# Patient Record
Sex: FEMALE | Race: WHITE | Employment: FULL TIME | URBAN - METROPOLITAN AREA
[De-identification: names, ages, dates, MRNs, and addresses within clinical notes are randomized per-mention and may not be internally consistent; named-entity substitution may affect disease eponyms.]

---

## 2017-01-09 ENCOUNTER — TELEPHONE (OUTPATIENT)
Dept: OBGYN | Facility: CLINIC | Age: 27
End: 2017-01-09

## 2017-01-09 NOTE — Clinical Note
February 9, 2017      Courtney Rebolledo  [address] [facility] [address]        Dear Courtney,    We have made contact with your insurance company as below based on the information that we have. Your actual coverage may differ. [facility] can not guarantee coverage. We recommend that if you have questions regarding coverage to call your insurance company or your insurance provider. Again, this is not a guarantee of benefits only information sharing in what we were given through your insurance that you have on file.  Insurance Co [employer] Phone # [phone]  ID [id number]   Group [id number]  Spoke w/ Sabine on 2/9/2017    Policy Eff Date 4/18/2016 and current Yes  CoInsurance 90% / 10%  Deductible $350.00 met to date $ZERO  MOOP $2000.00 met to date $ZERO  CoPay NA  Prior Auth Required:  No    Pre Existing Condition No      Surgeon JOSE ALBERTO Malhotra In Network Yes  Hospital [facility] In Network Yes    Referral Needed:  No.    Mailed to Patient Yes                  If No Document why by date                  If Yes                                   Date 2/9/2017      Sincerely,    Nata Mishra  Surgery Scheduler

## 2017-01-09 NOTE — TELEPHONE ENCOUNTER
Patient surgery scheduled on 2/15/2017 at 7:30am Check in 6:00am  Location for surgery to performed:   Endoscopy  Scheduled by Vadim 1/9/2017     Information Packet given :Yes: MAILED 1/9/2017    CPT codes given: Yes    40762        Consents signed? No  Rep Informed :N/A    PREOP DATE :  Update at [facility] [place]  In Epic :Yes    On Spreadsheet :Yes    On Calendar EB  :No    In [place] Calendar TOM  :No    Assist NA   Assist in Epic NA  Assist Notified as needed :CHRISTIANO Mishra  Surgery Scheduler

## 2017-01-09 NOTE — TELEPHONE ENCOUNTER
Patient Name:  Courtney Rebolledo ([id number]).  :  1990      Physician requests assist from:  None  Requested Dates:  2017  Schedule based on:  MD schedule  Amount of time needed for the procedure:  30min   Expected time off from work:  none  Surgeon:  Anastasiya Malhotra MD  Surgery permit to read:  LEEP  Preop Needed:  No  Location for surgery to performed:   Endoscopy  Anesthesia:  Paracervical block    -- Avelox [Moxifloxacin]    -- Chlorhexidine    -- Doxycycline    -- Quinolones   Nickel allergy:  Not Applicable  EMB: Not applicable    DIAGNOSIS:  LOKI III; severe cervical dysplasia    Special instructions:  none  Vendor Rep:  none  Meds Needed: None

## 2017-02-09 ENCOUNTER — TELEPHONE (OUTPATIENT)
Dept: OBGYN | Facility: CLINIC | Age: 27
End: 2017-02-09

## 2017-02-09 NOTE — TELEPHONE ENCOUNTER
Reason for Call:  Other call back    Detailed comments: Patient would like to know if she could   Take her medication the morning over her procedure    Phone Number Patient can be reached at: Home number on file [phone] (home)    Best Time: Anytime    Can we leave a detailed message on this number? YES    Call taken on 2/9/2017 at 4:02 PM by Susan Luke

## 2017-02-10 NOTE — TELEPHONE ENCOUNTER
Pt is wanting to know if she can take Lorazepam the morning of her surgery on 2/15/17 for LEEP. Note routed to Dr. Malhotra to review and advise.

## 2017-02-10 NOTE — TELEPHONE ENCOUNTER
Called pt and informed of Dr. Malhotra note below. Pt stated understanding and had no further questions.

## 2017-02-14 ENCOUNTER — TELEPHONE (OUTPATIENT)
Dept: NURSING | Facility: CLINIC | Age: 27
End: 2017-02-14

## 2017-02-14 NOTE — TELEPHONE ENCOUNTER
Pt schedule for a LEEP and possible cone biopsy. Pt starting to get very nervous. Discussed what a LEEP and cone biopsy are. Discussed what would happen when the pt gets to [facility]. Answered questions regarding procedure and what would happen after the procedure is done. Pt stated she felt much better. No further questions.

## 2017-02-15 ENCOUNTER — HOSPITAL ENCOUNTER (OUTPATIENT)
Facility: CLINIC | Age: 27
Discharge: HOME | End: 2017-02-15
Attending: OBSTETRICS & GYNECOLOGY | Admitting: OBSTETRICS & GYNECOLOGY
Payer: COMMERCIAL

## 2017-02-15 ENCOUNTER — SURGERY (OUTPATIENT)
Age: 27
End: 2017-02-15

## 2017-02-15 VITALS
SYSTOLIC BLOOD PRESSURE: 138 MMHG | WEIGHT: 293 LBS | RESPIRATION RATE: 16 BRPM | OXYGEN SATURATION: 99 % | BODY MASS INDEX: 43.4 KG/M2 | DIASTOLIC BLOOD PRESSURE: 87 MMHG | HEIGHT: 69 IN

## 2017-02-15 LAB — HCG SERPL QL: NEGATIVE

## 2017-02-15 PROCEDURE — 88307 TISSUE EXAM BY PATHOLOGIST: CPT | Mod: 26 | Performed by: OBSTETRICS & GYNECOLOGY

## 2017-02-15 PROCEDURE — 57522 CONIZATION OF CERVIX: CPT | Performed by: OBSTETRICS & GYNECOLOGY

## 2017-02-15 PROCEDURE — 36415 COLL VENOUS BLD VENIPUNCTURE: CPT | Performed by: OBSTETRICS & GYNECOLOGY

## 2017-02-15 PROCEDURE — 84703 CHORIONIC GONADOTROPIN ASSAY: CPT | Performed by: OBSTETRICS & GYNECOLOGY

## 2017-02-15 PROCEDURE — 88307 TISSUE EXAM BY PATHOLOGIST: CPT | Performed by: OBSTETRICS & GYNECOLOGY

## 2017-02-15 RX ORDER — ACETAMINOPHEN 325 MG/1
975 TABLET ORAL ONCE
Status: DISCONTINUED | OUTPATIENT
Start: 2017-02-15 | End: 2017-02-15 | Stop reason: HOSPADM

## 2017-02-15 NOTE — OP NOTE
DATE OF PROCEDURE:  02/15/2017       PREOPERATIVE DIAGNOSES:  Cervical intraepithelial neoplasia grade 3 (LOKI 3).      POSTOPERATIVE DIAGNOSES:  Cervical intraepithelial neoplasia grade 3 (LOKI 3).       PROCEDURE:  LEEP.       COMPLICATIONS:  None.      ESTIMATED BLOOD LOSS:  None.      ANESTHESIA:  Local paracervical block.      INDICATIONS:  Courtney Naylor is a 26-year-old woman with history of mildly abnormal Pap smears since .  At outside office, Courtney had colposcopy in 2016, which showed LOKI 2-3 at a 12 o'clock biopsy.  Repeat Pap smear and colposcopy revealed similar results with us in late December.  She was therefore counseled and consented for a LEEP procedure for definitive as well as diagnostic surgical management.      DETAILS OF PROCEDURE:  Courtney was taken to the endoscopy suite, where she was positioned in the dorsal lithotomy position.  A coated speculum was placed into the vagina, and Lugol solution was applied to the cervix.  Lack of uptake was noted at 12 o'clock position as well as a small area at 6 o'clock position.  Paracervical block was then performed using a 1% lidocaine with epinephrine solution with 5 mL being injected at 12, 4 and 8 o'clock.  Medium size loop was then selected, and LEEP procedure was performed without difficulty.  Electrocautery ball was then used to cauterize the bed of the biopsy with minimal bleeding noted.  Monsel solution was also used.  Speculum was removed, and the patient was taken to the recovery room in stable condition.  All specimens will be sent to pathology for further evaluation.         HAY CASTELAN MD             D: 02/15/2017 08:29   T: 02/15/2017 12:01   MT: KIM#[id number]      Name:     COURTNEY NAYLOR   MRN:      [id number]        Account:        [id number]   :      1990           Procedure Date: 02/15/2017      Document: [id number]       cc: Hay Castelan MD

## 2017-02-15 NOTE — H&P
This H&P has been reviewed and there are no clinically significant changes in the patient s condition.  The Patient is approved for surgery.    27yo female here for LEEP due to severe cervical dysplasia.  Will proceed as planned    Anastasiya Malhotra MD

## 2017-02-15 NOTE — BRIEF OP NOTE
[facility] Brief Operative Note    Pre-operative diagnosis: SIN 3   Post-operative diagnosis LOKI III     Procedure: Procedure(s):  LOOP ELECTROSURGICAL EXCISION PROCEDURE(PARA CEVICAL BLOCK) - Wound Class: II-Clean Contaminated   Surgeon(s): Surgeon(s) and Role:     * Anastasiya Malhotra MD - Primary   Estimated blood loss: * No values recorded between 2/15/2017 12:00 AM and 2/15/2017  8:25 AM *    Specimens: * No specimens in log *   Findings: Lack of uptake at 12 oclock and small area at 6 oclock; o/w normal cervix

## 2017-02-16 LAB — COPATH REPORT: NORMAL

## 2017-03-10 ENCOUNTER — OFFICE VISIT (OUTPATIENT)
Dept: URGENT CARE | Facility: URGENT CARE | Age: 27
End: 2017-03-10
Payer: COMMERCIAL

## 2017-03-10 VITALS
SYSTOLIC BLOOD PRESSURE: 130 MMHG | TEMPERATURE: 98.9 F | WEIGHT: 293 LBS | OXYGEN SATURATION: 98 % | DIASTOLIC BLOOD PRESSURE: 76 MMHG | BODY MASS INDEX: 41.95 KG/M2 | HEIGHT: 70 IN | HEART RATE: 109 BPM

## 2017-03-10 DIAGNOSIS — J10.1 INFLUENZA A: ICD-10-CM

## 2017-03-10 DIAGNOSIS — R52 BODY ACHES: ICD-10-CM

## 2017-03-10 DIAGNOSIS — R07.0 THROAT PAIN: Primary | ICD-10-CM

## 2017-03-10 DIAGNOSIS — J45.40 MODERATE PERSISTENT ASTHMA WITHOUT COMPLICATION: ICD-10-CM

## 2017-03-10 LAB
DEPRECATED S PYO AG THROAT QL EIA: NORMAL
FLUAV+FLUBV AG SPEC QL: ABNORMAL
FLUAV+FLUBV AG SPEC QL: POSITIVE
MICRO REPORT STATUS: NORMAL
SPECIMEN SOURCE: ABNORMAL
SPECIMEN SOURCE: NORMAL

## 2017-03-10 PROCEDURE — 87081 CULTURE SCREEN ONLY: CPT | Performed by: FAMILY MEDICINE

## 2017-03-10 PROCEDURE — 87880 STREP A ASSAY W/OPTIC: CPT | Performed by: FAMILY MEDICINE

## 2017-03-10 PROCEDURE — 99214 OFFICE O/P EST MOD 30 MIN: CPT | Performed by: FAMILY MEDICINE

## 2017-03-10 PROCEDURE — 87804 INFLUENZA ASSAY W/OPTIC: CPT | Performed by: FAMILY MEDICINE

## 2017-03-10 RX ORDER — PREDNISONE 20 MG/1
20 TABLET ORAL 2 TIMES DAILY
Qty: 10 TABLET | Refills: 0 | Status: SHIPPED | OUTPATIENT
Start: 2017-03-10 | End: 2017-07-25

## 2017-03-10 RX ORDER — OSELTAMIVIR PHOSPHATE 75 MG/1
75 CAPSULE ORAL 2 TIMES DAILY
Qty: 10 CAPSULE | Refills: 0 | Status: SHIPPED | OUTPATIENT
Start: 2017-03-10 | End: 2017-07-25

## 2017-03-10 NOTE — NURSING NOTE
"Chief Complaint   Patient presents with     Sinus Problem     face pressure 3x days      Pharyngitis     sore throat 3x days      Asthma     3x days        Initial /76  Pulse 109  Temp 98.9  F (37.2  C) (Oral)  Ht 5' 10\" (1.778 m)  Wt (!) 308 lb 14.4 oz (140.1 kg)  SpO2 98%  BMI 44.32 kg/m2 Estimated body mass index is 44.32 kg/(m^2) as calculated from the following:    Height as of this encounter: 5' 10\" (1.778 m).    Weight as of this encounter: 308 lb 14.4 oz (140.1 kg).  Medication Reconciliation: complete    "

## 2017-03-10 NOTE — MR AVS SNAPSHOT
"              After Visit Summary   3/10/2017    Courtney Rebolledo    MRN: [id number]           Patient Information     Date Of Birth          1990        Visit Information        Provider Department      3/10/2017 5:00 PM Sherri Gonzales MD [facility]        Today's Diagnoses     Throat pain    -  1    Body aches        Moderate persistent asthma without complication        Influenza A           Follow-ups after your visit        Who to contact     If you have questions or need follow up information about today's clinic visit or your schedule please contact [facility] directly at [phone].  Normal or non-critical lab and imaging results will be communicated to you by [employer], letter or phone within 4 business days after the clinic has received the results. If you do not hear from us within 7 days, please contact the clinic through [employer] or phone. If you have a critical or abnormal lab result, we will notify you by phone as soon as possible.  Submit refill requests through [employer] or call your pharmacy and they will forward the refill request to us. Please allow 3 business days for your refill to be completed.          Additional Information About Your Visit        MyChart Information     [employer] gives you secure access to your electronic health record. If you see a primary care provider, you can also send messages to your care team and make appointments. If you have questions, please call your primary care clinic.  If you do not have a primary care provider, please call [phone] and they will assist you.        Care EveryWhere ID     This is your Care EveryWhere ID. This could be used by other organizations to access your [facility] medical records  [id number]        Your Vitals Were     Pulse Temperature Height Pulse Oximetry BMI (Body Mass Index)       109 98.9  F (37.2  C) (Oral) 5' 10\" (1.778 m) 98% 44.32 kg/m2        Blood Pressure from Last 3 " Encounters:   03/10/17 130/76   02/15/17 138/87   12/27/16 132/76    Weight from Last 3 Encounters:   03/10/17 (!) 308 lb 14.4 oz (140.1 kg)   02/15/17 300 lb (136.1 kg)   12/27/16 (!) 315 lb (142.9 kg)              We Performed the Following     Beta strep group A culture     Influenza A/B antigen     Rapid strep screen          Today's Medication Changes          These changes are accurate as of: 3/10/17  6:34 PM.  If you have any questions, ask your nurse or doctor.               Start taking these medicines.        Dose/Directions    oseltamivir 75 MG capsule   Commonly known as:  TAMIFLU   Used for:  Influenza A   Started by:  Sherri Gonzales MD        Dose:  75 mg   Take 1 capsule (75 mg) by mouth 2 times daily   Quantity:  10 capsule   Refills:  0       predniSONE 20 MG tablet   Commonly known as:  DELTASONE   Used for:  Moderate persistent asthma without complication   Started by:  Sherri Gonzales MD        Dose:  20 mg   Take 1 tablet (20 mg) by mouth 2 times daily   Quantity:  10 tablet   Refills:  0            Where to get your medicines      Some of these will need a paper prescription and others can be bought over the counter.  Ask your nurse if you have questions.     Bring a paper prescription for each of these medications     oseltamivir 75 MG capsule    predniSONE 20 MG tablet                Primary Care Provider Office Phone # Fax #    Fer Cartwright [phone]       [address] [address]        Thank you!     Thank you for choosing [facility]  for your care. Our goal is always to provide you with excellent care. Hearing back from our patients is one way we can continue to improve our services. Please take a few minutes to complete the written survey that you may receive in the mail after your visit with us. Thank you!             Your Updated Medication List - Protect others around you: Learn how to safely use,  store and throw away your medicines at www.disposemymeds.org.          This list is accurate as of: 3/10/17  6:34 PM.  Always use your most recent med list.                   Brand Name Dispense Instructions for use    ADDERALL XR 20 MG per 24 hr capsule   Generic drug:  amphetamine-dextroamphetamine      TK 1 C PO ONCE D IN THE MORNING       DULERA 200-5 MCG/ACT oral inhaler   Generic drug:  mometasone-formoterol      INHALE 2 PUFFS PO BID       LORazepam 0.5 MG tablet    ATIVAN     TK UP TO 2 TS PER WEEK UTD       montelukast 10 MG tablet    SINGULAIR     TK 1 T PO HS       norgestrel-ethinyl estradiol 0.3-30 MG-MCG per tablet    LO/OVRAL    84 tablet    Take 1 tablet by mouth daily       oseltamivir 75 MG capsule    TAMIFLU    10 capsule    Take 1 capsule (75 mg) by mouth 2 times daily       predniSONE 20 MG tablet    DELTASONE    10 tablet    Take 1 tablet (20 mg) by mouth 2 times daily       VENTOLIN  (90 BASE) MCG/ACT Inhaler   Generic drug:  albuterol      INHALE 2 PUFFS PO QID PRN

## 2017-03-11 NOTE — PROGRESS NOTES
Chief Complaint   Patient presents with     Sinus Problem     face pressure 3x days      Pharyngitis     sore throat 3x days      Asthma     3x days      SUBJECTIVE:  Courtney Rebolledo, a 26 year old female with h/o throat pain , body ache and also asthma symptoms with coughing  scheduled an appointment to discuss the following issues:     Throat pain  Body aches  Moderate persistent asthma without complication  Influenza A     She has been noticing the symptoms for last 2 days   Has been using her rescue inhalor very often, she denies any acute fever but has been noticing some chills too   She also has nasal congestion , denies any acute sob or chest heaviness   Past Medical History   Diagnosis Date     Abnormal Pap smear of cervix 2014,15,16     ASC-US, +HRHPV     Anxiety      lorazepam prn     Asthma, moderate persistent      LOKI III with severe dysplasia 5/2016     Family history of breast cancer      mother age 36, MGM age 42     Morbid obesity (H)      BMI 43     Personal history of cervical dysplasia 5/2016     LOKI II-III on 12'oclock bx      Past Surgical History   Procedure Laterality Date     C oral surgery procedure       Knee surgery  2006     Conization leep N/A 2/15/2017     Procedure: CONIZATION LEEP;  Surgeon: Anastasiya Malhotra MD;  Location: [facility]        Social History     Social History     Marital status: Single     Spouse name: N/A     Number of children: N/A     Years of education: N/A     Occupational History     Not on file.     Social History Main Topics     Smoking status: Never Smoker     Smokeless tobacco: Never Used     Alcohol use 0.0 oz/week     0 Standard drinks or equivalent per week      Comment: twice monthly     Drug use: No     Sexual activity: Yes     Partners: Male     Birth control/ protection: Condom     Other Topics Concern     Not on file     Social History Narrative        Current Outpatient Prescriptions   Medication Sig Dispense Refill     oseltamivir (TAMIFLU) 75 MG capsule  "Take 1 capsule (75 mg) by mouth 2 times daily 10 capsule 0     predniSONE (DELTASONE) 20 MG tablet Take 1 tablet (20 mg) by mouth 2 times daily 10 tablet 0     norgestrel-ethinyl estradiol (LO/OVRAL) 0.3-30 MG-MCG per tablet Take 1 tablet by mouth daily 84 tablet 3     ADDERALL XR 20 MG per capsule TK 1 C PO ONCE D IN THE MORNING  0     DULERA 200-5 MCG/ACT oral inhaler INHALE 2 PUFFS PO BID  0     montelukast (SINGULAIR) 10 MG tablet TK 1 T PO HS  1     VENTOLIN  (90 BASE) MCG/ACT inhaler INHALE 2 PUFFS PO QID PRN  3     LORazepam (ATIVAN) 0.5 MG tablet TK UP TO 2 TS PER WEEK UTD  0       Health Maintenance   Topic Date Due     ASTHMA ACTION PLAN Q1 YR (NO INBASKET)  06/24/1996     ASTHMA CONTROL TEST Q6 MOS (NO INBASKET)  06/24/1996     HPV IMMUNIZATION (1 of 3 - Female 3 Dose Series) 06/24/2002     TETANUS IMMUNIZATION (SYSTEM ASSIGNED)  06/24/2009     INFLUENZA VACCINE (SYSTEM ASSIGNED)  09/01/2017     PAP SCREENING Q3 YR (SYSTEM ASSIGNED)  12/27/2019        ROS:  CONSTITUTIONAL:no fever, no chills or sweats, no excessive fatigue, no significant change in weight  CV: neg   RESP -coughing and wheezing   GI:  Neg   NEURO: neg   MSK - neg   Skin - neg   Pyschiatry-neg     OBJECTIVE:  /76  Pulse 109  Temp 98.9  F (37.2  C) (Oral)  Ht 5' 10\" (1.778 m)  Wt (!) 308 lb 14.4 oz (140.1 kg)  SpO2 98%  BMI 44.32 kg/m2      EXAM:  GENERAL APPEARANCE: healthy, alert and no distress  EYES: EOMI,  PERRL  HENT: ear canals and TM's normal and nose and mouth without ulcers or lesions  RESP: lungs clear to auscultation - no rales, rhonchi or wheezes  CV: regular rates and rhythm, normal S1 S2, no S3 or S4 and no murmur, click or rub -  ABDOMEN:  soft, nontender, no HSM or masses and bowel sounds normal  NEURO: Normal strength and tone, sensory exam grossly normal, mentation intact and speech normal    Results for orders placed or performed in visit on 03/10/17   Rapid strep screen   Result Value Ref Range    Specimen " Description Throat     Rapid Strep A Screen       NEGATIVE: No Group A streptococcal antigen detected by immunoassay, await   culture report.      Micro Report Status FINAL 03/10/2017    Influenza A/B antigen   Result Value Ref Range    Influenza A/B Agn Specimen Nasopharyngeal     Influenza A Positive (A) NEG    Influenza B  NEG     Negative   Test results must be correlated with clinical data. If necessary, results   should be confirmed by a molecular assay or viral culture.             ASSESSMENT/PLAN:  Courtney was seen today for sinus problem, pharyngitis and asthma.    Diagnoses and all orders for this visit:    Throat pain  -     Rapid strep screen  -     Beta strep group A culture    Body aches  -     Influenza A/B antigen    Moderate persistent asthma without complication  -     predniSONE (DELTASONE) 20 MG tablet; Take 1 tablet (20 mg) by mouth 2 times daily    Influenza A  -     oseltamivir (TAMIFLU) 75 MG capsule; Take 1 capsule (75 mg) by mouth 2 times daily    advised to continue on albuterol neb and also steroid inhalor for next 2 days till symptoms get better   Also consider starting prednisone oral if symptoms get worse         Follow up if  symptoms fail to improve or worsens   Pt understood and agreed with plan             Shreri Gonzales MD

## 2017-03-12 LAB
BACTERIA SPEC CULT: NORMAL
MICRO REPORT STATUS: NORMAL
SPECIMEN SOURCE: NORMAL

## 2017-07-25 ENCOUNTER — OFFICE VISIT (OUTPATIENT)
Dept: OBGYN | Facility: CLINIC | Age: 27
End: 2017-07-25
Payer: COMMERCIAL

## 2017-07-25 VITALS
SYSTOLIC BLOOD PRESSURE: 122 MMHG | HEART RATE: 76 BPM | WEIGHT: 293 LBS | BODY MASS INDEX: 41.95 KG/M2 | HEIGHT: 70 IN | DIASTOLIC BLOOD PRESSURE: 72 MMHG

## 2017-07-25 DIAGNOSIS — Z30.41 USES ORAL CONTRACEPTION: ICD-10-CM

## 2017-07-25 DIAGNOSIS — Z30.09 GENERAL COUNSELING AND ADVICE ON CONTRACEPTIVE MANAGEMENT: Primary | ICD-10-CM

## 2017-07-25 DIAGNOSIS — D06.9 CIN III WITH SEVERE DYSPLASIA: ICD-10-CM

## 2017-07-25 PROCEDURE — 99213 OFFICE O/P EST LOW 20 MIN: CPT | Performed by: OBSTETRICS & GYNECOLOGY

## 2017-07-25 NOTE — PROGRESS NOTES
SUBJECTIVE:                                                   Courtney Rebolledo is a 27 year old female who presents to clinic today for the following health issue(s):  Patient presents with:  Consult: here to talk about family planning, and wrap up loose ends prior to move to [place]. Not sure on timeline for pregnancy, but would like to change OCPs in the meantime.      HPI:  Here today to discuss current birth control.  HAs been using Lo-Ovral since her procedure with me in February due to irregular/more frequent menstrual cycles.  Over the last 6 months, has noted complete lack of menses as well as increased moodiness and breast tenderness.  No cramping.  Has been taking pills fairly consistently --occ missed pills but no spotting/bleeding.    Moodiness has been pronounced in the last few months.  Appreciates that this is likely multi-factorial but can't help but notice the temporal relationship between the changes and her pill.  Does not seem to be cyclic --no drastic changes during placebo week.    +breast tenderness --both breasts equally and again, does not seem to be cyclic.  Most days is anxious to get out of her bra due to discomfort.  Denies change in bras, bra size, weight, etc.  Not better or worse during placebo week.  Considering TTC in the next year.  Questions about LEEP and implications with pregnancy    No LMP recorded. Patient is not currently having periods (Reason: Birth Control)..   Patient is sexually active, No obstetric history on file..  Using oral contraceptives for contraception.    reports that she has never smoked. She has never used smokeless tobacco.      STD testing offered?  Declined    Health maintenance updated:  yes    Today's PHQ-2 Score:   PHQ-2 ( 1999 [employer]) 7/25/2017   Q1: Little interest or pleasure in doing things 0   Q2: Feeling down, depressed or hopeless 0   PHQ-2 Score 0     Today's PHQ-9 Score:   PHQ-9 SCORE 7/29/2016   Total Score 0     Today's AKILA-7 Score:   AKILA-7  "SCORE 7/29/2016   Total Score 7       Problem list and histories reviewed & adjusted, as indicated.  Additional history: as documented.    Patient Active Problem List   Diagnosis     Anxiety     Asthma, moderate persistent     Morbid obesity (H)     Abnormal Pap smear of cervix     Family history of breast cancer     Personal history of cervical dysplasia     LOKI III with severe dysplasia     Past Surgical History:   Procedure Laterality Date     C ORAL SURGERY PROCEDURE       CONIZATION LEEP N/A 2/15/2017    Procedure: CONIZATION LEEP;  Surgeon: Anastasiya Malhotra MD;  Location:  GI     KNEE SURGERY  2006      Social History   Substance Use Topics     Smoking status: Never Smoker     Smokeless tobacco: Never Used     Alcohol use 0.0 oz/week     0 Standard drinks or equivalent per week      Comment: twice monthly      Problem (# of Occurrences) Relation (Name,Age of Onset)    Breast Cancer (2) Mother (36): no BRCA testing, Maternal Grandmother (42)            Current Outpatient Prescriptions   Medication Sig     norgestrel-ethinyl estradiol (LO/OVRAL) 0.3-30 MG-MCG per tablet Take 1 tablet by mouth daily     ADDERALL XR 20 MG per capsule TK 1 C PO ONCE D IN THE MORNING     DULERA 200-5 MCG/ACT oral inhaler INHALE 2 PUFFS PO BID     montelukast (SINGULAIR) 10 MG tablet TK 1 T PO HS     VENTOLIN  (90 BASE) MCG/ACT inhaler INHALE 2 PUFFS PO QID PRN     LORazepam (ATIVAN) 0.5 MG tablet TK UP TO 2 TS PER WEEK UTD     No current facility-administered medications for this visit.      Allergies   Allergen Reactions     Avelox [Moxifloxacin]      Chlorhexidine      Doxycycline      Quinolones        ROS:  12 point review of systems negative other than symptoms noted below.  Breast: Tenderness  Genitourinary: Irregular Menses and No Periods  Skin: Rash  Endocrine: Excess Hair Growth  Psychiatric: Anxiety    OBJECTIVE:     /72  Pulse 76  Ht 5' 10\" (1.778 m)  Wt (!) 313 lb (142 kg)  BMI 44.91 kg/m2  Body mass " index is 44.91 kg/(m^2).    Exam:  Constitutional:  Appearance: Well nourished, well developed alert, in no acute distress  Neurologic/Psychiatric:  Mental Status:  Oriented X3      In-Clinic Test Results:  No results found for this or any previous visit (from the past 24 hour(s)).    ASSESSMENT/PLAN:                                                        ICD-10-CM    1. General counseling and advice on contraceptive management Z30.09    2. LOKI III with severe dysplasia D06.9    3. Uses oral contraception Z30.41        Patient Instructions   Long discussion regarding birth control options in the setting of Courtney's current concerns.  Discussed alternative birth control pill vs nuva ring vs progesterone only options taking into account her history of PCOS, recent LEEP for LOKI III and desires to try to conceive in the next several months.  Will continue current OCP x additional 3 months and then discontinue for the purposes of cycle monitoring in anticipation of TTC.  Will establish care with OB/GYN in [place] upon transfer.  Reminded her of the importance of repeating pap smear/HPV testing in 2/2018 due to severe cervical dysplasia this year.      Anastasiya Malhotra MD  [facility] [place]

## 2017-07-25 NOTE — MR AVS SNAPSHOT
After Visit Summary   7/25/2017    Courtney Rebolledo    MRN: [id number]           Patient Information     Date Of Birth          1990        Visit Information        Provider Department      7/25/2017 4:10 PM Anastasiya Malhotra MD [facility]        Today's Diagnoses     General counseling and advice on contraceptive management    -  1    LOKI III with severe dysplasia        Uses oral contraception          Care Instructions    Long discussion regarding birth control options in the setting of Courtney's current concerns.  Discussed alternative birth control pill vs nuva ring vs progesterone only options taking into account her history of PCOS, recent LEEP for LOKI III and desires to try to conceive in the next several months.  Will continue current OCP x additional 3 months and then discontinue for the purposes of cycle monitoring in anticipation of TTC.  Will establish care with OB/GYN in [place] upon transfer.  Reminded her of the importance of repeating pap smear/HPV testing in 2/2018 due to severe cervical dysplasia this year.          Follow-ups after your visit        Follow-up notes from your care team     Return if symptoms worsen or fail to improve.      Who to contact     If you have questions or need follow up information about today's clinic visit or your schedule please contact [facility] directly at [phone].  Normal or non-critical lab and imaging results will be communicated to you by MyChart, letter or phone within 4 business days after the clinic has received the results. If you do not hear from us within 7 days, please contact the clinic through MyChart or phone. If you have a critical or abnormal lab result, we will notify you by phone as soon as possible.  Submit refill requests through [employer] or call your pharmacy and they will forward the refill request to us. Please allow 3 business days for your refill to be completed.          Additional  "Information About Your Visit        MyChart Information     [employer] gives you secure access to your electronic health record. If you see a primary care provider, you can also send messages to your care team and make appointments. If you have questions, please call your primary care clinic.  If you do not have a primary care provider, please call [phone] and they will assist you.        Care EveryWhere ID     This is your Care EveryWhere ID. This could be used by other organizations to access your [place] medical records  [id number]        Your Vitals Were     Pulse Height BMI (Body Mass Index)             76 5' 10\" (1.778 m) 44.91 kg/m2          Blood Pressure from Last 3 Encounters:   07/25/17 122/72   03/10/17 130/76   02/15/17 138/87    Weight from Last 3 Encounters:   07/25/17 (!) 313 lb (142 kg)   03/10/17 (!) 308 lb 14.4 oz (140.1 kg)   02/15/17 300 lb (136.1 kg)              Today, you had the following     No orders found for display       Primary Care Provider Office Phone # Fax #    Fer Cartwright [phone]       [facility] [address] [place] [address]        Equal Access to Services     LIDYA VARELA : Hadii aad ku hadasho Soomaali, waaxda luqadaha, qaybta kaalmada adeegyada, waxay idiin hayparveenn davidson villa la'juan ah. So [facility] [phone].    ATENCIÓN: Si habla español, tiene a martinez disposición servicios gratuitos de asistencia lingüística. Llidania al [phone].    We comply with applicable federal civil rights laws and [place] laws. We do not discriminate on the basis of race, color, national origin, age, disability sex, sexual orientation or gender identity.            Thank you!     Thank you for choosing [facility]  for your care. Our goal is always to provide you with excellent care. Hearing back from our patients is one way we can continue to improve our services. Please take a few minutes to complete the written survey that you may " receive in the mail after your visit with us. Thank you!             Your Updated Medication List - Protect others around you: Learn how to safely use, store and throw away your medicines at www.disposemymeds.org.          This list is accurate as of: 7/25/17  8:01 PM.  Always use your most recent med list.                   Brand Name Dispense Instructions for use Diagnosis    ADDERALL XR 20 MG per 24 hr capsule   Generic drug:  amphetamine-dextroamphetamine      TK 1 C PO ONCE D IN THE MORNING        DULERA 200-5 MCG/ACT oral inhaler   Generic drug:  mometasone-formoterol      INHALE 2 PUFFS PO BID        LORazepam 0.5 MG tablet    ATIVAN     TK UP TO 2 TS PER WEEK UTD        montelukast 10 MG tablet    SINGULAIR     TK 1 T PO HS        norgestrel-ethinyl estradiol 0.3-30 MG-MCG per tablet    LO/OVRAL    84 tablet    Take 1 tablet by mouth daily    PCOS (polycystic ovarian syndrome)       VENTOLIN  (90 BASE) MCG/ACT Inhaler   Generic drug:  albuterol      INHALE 2 PUFFS PO QID PRN

## 2017-07-26 NOTE — PATIENT INSTRUCTIONS
Long discussion regarding birth control options in the setting of Courtney's current concerns.  Discussed alternative birth control pill vs nuva ring vs progesterone only options taking into account her history of PCOS, recent LEEP for LOKI III and desires to try to conceive in the next several months.  Will continue current OCP x additional 3 months and then discontinue for the purposes of cycle monitoring in anticipation of TTC.  Will establish care with OB/GYN in [place] upon transfer.  Reminded her of the importance of repeating pap smear/HPV testing in 2/2018 due to severe cervical dysplasia this year.

## 2020-03-10 ENCOUNTER — HEALTH MAINTENANCE LETTER (OUTPATIENT)
Age: 30
End: 2020-03-10

## 2021-02-10 NOTE — PROGRESS NOTES
SUBJECTIVE:                                                   Courtney Rebolledo is a 30 year old female who presents to clinic today for the following health issue(s):  Patient presents with:  Abnormal Uterine Bleeding: stopped OCP in November and had a normal cycle 11/20, then since second week of december through today has been having daily spotting between cycles      HPI:  Here today to discuss recent irregular menses/breakthrough bleeding/spotting in the last several months.  Has not been seen in our office since her LEEP in 2017.  Moved to [place] shortly thereafter and just back temporarily to help her mother recover from COVID.    Has been on and off Lo-Ovral OCPs for the last several years.  Longstanding history of irregular menses due to PCOS.  Currently TTC.   Had been on Lo-ovral at time of our last visit in 2017.  Stopped for approx 1.5yrs in [place] and had longer but fairly regular cycles.  ~35d cycles with ~7d menses.  Began having intermenstrual bleeding/spotting in mid 7643-8652 and started back on OCPs in hopes of regulating cycles.  Improved cycles and stopped again after 3 months of usage.  Has been back and forth on this cycle until recently.  Again stopped her Lo-Ovral in November and has been having nearly daily spotting with regular menses every 35d.  Light bleeding, no cramping.  Occ small clots noted.  +SA --does have bleeding after sex. Usually lasts a few days.  No pain or cramping.  Otherwise doing pretty well.  Normal energy, normal appetite, normal bowel/bladder function.  Began working with a nutritionist/dietician last May with regards to PCOS and hopes for weight loss in anticipation of getting pregnant.  Has some labs she would like checked today.  Hx LOKI II with me on LEEP in 2017 --had one normal pap smear in [place] and is now overdue for repeat.  Agrees to do today  Likely moving back to [place] in the next month or so        Patient's last menstrual period was 02/11/2021  (exact date)..     Patient is sexually active, No obstetric history on file..  Using none and natural family planning for contraception.    reports that she has never smoked. She has never used smokeless tobacco.    STD testing offered?  Declined    Health maintenance updated:  yes    Today's PHQ-2 Score:   PHQ-2 ( 1999 Pfizer) 7/25/2017   Q1: Little interest or pleasure in doing things 0   Q2: Feeling down, depressed or hopeless 0   PHQ-2 Score 0     Today's PHQ-9 Score:   PHQ-9 SCORE 2/12/2021   PHQ-9 Total Score 0     Today's AKILA-7 Score:   AKILA-7 SCORE 2/12/2021   Total Score 0       Problem list and histories reviewed & adjusted, as indicated.  Additional history: as documented.    Patient Active Problem List   Diagnosis     Anxiety     Asthma, moderate persistent     Morbid obesity (H)     Abnormal Pap smear of cervix     Family history of breast cancer     Personal history of cervical dysplasia     LOKI III with severe dysplasia     Past Surgical History:   Procedure Laterality Date     C ORAL SURGERY PROCEDURE       CONIZATION LEEP N/A 2/15/2017    Procedure: CONIZATION LEEP;  Surgeon: Anastasiya Malhotra MD;  Location:  GI     KNEE SURGERY  2006      Social History     Tobacco Use     Smoking status: Never Smoker     Smokeless tobacco: Never Used   Substance Use Topics     Alcohol use: Yes     Alcohol/week: 0.0 standard drinks     Comment: twice monthly      Problem (# of Occurrences) Relation (Name,Age of Onset)    Breast Cancer (2) Mother (36): no BRCA testing, Maternal Grandmother (42)    No Known Problems (6) Father, Sister, Brother, Maternal Grandfather, Paternal Grandmother, Other            Current Outpatient Medications   Medication Sig     budesonide-formoterol (SYMBICORT) 160-4.5 MCG/ACT Inhaler Inhale 2 puffs into the lungs 2 times daily     DULERA 200-5 MCG/ACT oral inhaler INHALE 2 PUFFS PO BID     montelukast (SINGULAIR) 10 MG tablet TK 1 T PO HS     VENTOLIN  (90 BASE) MCG/ACT inhaler  "INHALE 2 PUFFS PO QID PRN     metFORMIN (GLUCOPHAGE) 500 MG tablet Take 3 tablets by mouth every 24 hours     No current facility-administered medications for this visit.      Allergies   Allergen Reactions     Avelox [Moxifloxacin]      Chlorhexidine      Doxycycline      Quinolones        ROS:  12 point review of systems negative other than symptoms noted below or in the HPI.  Genitourinary: Spotting  No urinary frequency or dysuria, bladder or kidney problems      OBJECTIVE:     /82   Ht 1.778 m (5' 10\")   Wt 134.3 kg (296 lb)   LMP 02/11/2021 (Exact Date)   Breastfeeding No   BMI 42.47 kg/m    Body mass index is 42.47 kg/m .    Exam:  Constitutional:  Appearance: Well nourished, well developed alert, in no acute distress  Gastrointestinal:  Abdominal Examination:  Abdomen nontender to palpation, tone normal without rigidity or guarding, no masses present, umbilicus without lesions; Liver/Spleen:  No hepatomegaly present, liver nontender to palpation; Hernias:  No hernias present  Skin: General Inspection:  No rashes present, no lesions present, no areas of discoloration.  Neurologic:  Mental Status:  Oriented X3.  Normal strength and tone, sensory exam grossly normal, mentation intact and speech normal.    Psychiatric:  Mentation appears normal and affect normal/bright.  Pelvic Exam:  External Genitalia:     Normal appearance for age, no discharge present, no tenderness present, no inflammatory lesions present, color normal  Vagina:     Normal vaginal vault without central or paravaginal defects, no discharge present, no inflammatory lesions present, no masses present  Bladder:     Nontender to palpation  Urethra:   Urethral Body:  Urethra palpation normal, urethra structural support normal   Urethral Meatus:  No erythema or lesions present  Cervix:     Appearance healthy, no lesions present, nontender to palpation, no bleeding present  Uterus:     Uterus: firm, normal sized and nontender, anteverted " and midplane in position.   Adnexa:     No adnexal tenderness present, no adnexal masses present  Perineum:     Perineum within normal limits, no evidence of trauma, no rashes or skin lesions present  Anus:     Anus within normal limits, no hemorrhoids present  Inguinal Lymph Nodes:     No lymphadenopathy present  Pubic Hair:     Normal pubic hair distribution for age  Genitalia and Groin:     No rashes present, no lesions present, no areas of discoloration, no masses present       In-Clinic Test Results:  No results found for this or any previous visit (from the past 24 hour(s)).    ASSESSMENT/PLAN:                                                        ICD-10-CM    1. Irregular menses  N92.6 Lipid panel reflex to direct LDL Fasting     Comprehensive metabolic panel     Insulin level     Vitamin D Deficiency     US Transvaginal Non OB   2. Screening for cervical cancer  Z12.4 Pap imaged thin layer screen with HPV - recommended age 30 - 65     HPV High Risk Types DNA Cervical     TSH     T4, free     CRP, inflammation   3. PCOS (polycystic ovarian syndrome)  E28.2    4. BMI 40.0-44.9, adult (H)  Z68.41        Patient Instructions   Discussed irregular menses and PCOS.  Discussed importanct of cycle regulation and weight control to help with hormonal balance.  Will check labs as requested per dietician and have her take results to that provider  Will return for pelvic ultrasound to further evaluate lining of uterus; normal exam done today.  Pap smear and HPV testing done today due to hx LOKI III; Courtney has had only one co-testing since our LEEP together in 2017.  IF normal today, would recommend co-testing every 3yrs per guidelines.      Review of current concerns as well as history including prior labwork, imaging, pathology, medications, done today prior to and following visit.  Will order labs and follow up with pelvic ultrasound for further evaluation of irregular bleeding episodes.    Anastasiya Malhotra MD  [facility]  [facility] [place]

## 2021-02-12 ENCOUNTER — OFFICE VISIT (OUTPATIENT)
Dept: OBGYN | Facility: CLINIC | Age: 31
End: 2021-02-12
Payer: COMMERCIAL

## 2021-02-12 VITALS
DIASTOLIC BLOOD PRESSURE: 82 MMHG | SYSTOLIC BLOOD PRESSURE: 130 MMHG | HEIGHT: 70 IN | WEIGHT: 293 LBS | BODY MASS INDEX: 41.95 KG/M2

## 2021-02-12 DIAGNOSIS — N92.6 IRREGULAR MENSES: Primary | ICD-10-CM

## 2021-02-12 DIAGNOSIS — E28.2 PCOS (POLYCYSTIC OVARIAN SYNDROME): ICD-10-CM

## 2021-02-12 DIAGNOSIS — Z12.4 SCREENING FOR CERVICAL CANCER: ICD-10-CM

## 2021-02-12 LAB
CRP SERPL-MCNC: 9.5 MG/L (ref 0–8)
INSULIN SERPL-ACNC: 40.3 MU/L (ref 3–25)

## 2021-02-12 PROCEDURE — 86140 C-REACTIVE PROTEIN: CPT | Performed by: OBSTETRICS & GYNECOLOGY

## 2021-02-12 PROCEDURE — G0145 SCR C/V CYTO,THINLAYER,RESCR: HCPCS | Performed by: OBSTETRICS & GYNECOLOGY

## 2021-02-12 PROCEDURE — 82306 VITAMIN D 25 HYDROXY: CPT | Performed by: OBSTETRICS & GYNECOLOGY

## 2021-02-12 PROCEDURE — 80053 COMPREHEN METABOLIC PANEL: CPT | Performed by: OBSTETRICS & GYNECOLOGY

## 2021-02-12 PROCEDURE — 36415 COLL VENOUS BLD VENIPUNCTURE: CPT | Performed by: OBSTETRICS & GYNECOLOGY

## 2021-02-12 PROCEDURE — 84443 ASSAY THYROID STIM HORMONE: CPT | Performed by: OBSTETRICS & GYNECOLOGY

## 2021-02-12 PROCEDURE — 84439 ASSAY OF FREE THYROXINE: CPT | Performed by: OBSTETRICS & GYNECOLOGY

## 2021-02-12 PROCEDURE — 87624 HPV HI-RISK TYP POOLED RSLT: CPT | Performed by: OBSTETRICS & GYNECOLOGY

## 2021-02-12 PROCEDURE — 99203 OFFICE O/P NEW LOW 30 MIN: CPT | Performed by: OBSTETRICS & GYNECOLOGY

## 2021-02-12 PROCEDURE — 80061 LIPID PANEL: CPT | Performed by: OBSTETRICS & GYNECOLOGY

## 2021-02-12 PROCEDURE — 83525 ASSAY OF INSULIN: CPT | Performed by: OBSTETRICS & GYNECOLOGY

## 2021-02-12 RX ORDER — BUDESONIDE AND FORMOTEROL FUMARATE DIHYDRATE 160; 4.5 UG/1; UG/1
2 AEROSOL RESPIRATORY (INHALATION) 2 TIMES DAILY
COMMUNITY

## 2021-02-12 ASSESSMENT — MIFFLIN-ST. JEOR: SCORE: 2147.9

## 2021-02-12 ASSESSMENT — ANXIETY QUESTIONNAIRES
7. FEELING AFRAID AS IF SOMETHING AWFUL MIGHT HAPPEN: NOT AT ALL
2. NOT BEING ABLE TO STOP OR CONTROL WORRYING: NOT AT ALL
6. BECOMING EASILY ANNOYED OR IRRITABLE: NOT AT ALL
5. BEING SO RESTLESS THAT IT IS HARD TO SIT STILL: NOT AT ALL
GAD7 TOTAL SCORE: 0
IF YOU CHECKED OFF ANY PROBLEMS ON THIS QUESTIONNAIRE, HOW DIFFICULT HAVE THESE PROBLEMS MADE IT FOR YOU TO DO YOUR WORK, TAKE CARE OF THINGS AT HOME, OR GET ALONG WITH OTHER PEOPLE: NOT DIFFICULT AT ALL
3. WORRYING TOO MUCH ABOUT DIFFERENT THINGS: NOT AT ALL
1. FEELING NERVOUS, ANXIOUS, OR ON EDGE: NOT AT ALL

## 2021-02-12 ASSESSMENT — PATIENT HEALTH QUESTIONNAIRE - PHQ9
5. POOR APPETITE OR OVEREATING: NOT AT ALL
SUM OF ALL RESPONSES TO PHQ QUESTIONS 1-9: 0

## 2021-02-12 NOTE — PATIENT INSTRUCTIONS
Discussed irregular menses and PCOS.  Discussed importanct of cycle regulation and weight control to help with hormonal balance.  Will check labs as requested per dietician and have her take results to that provider  Will return for pelvic ultrasound to further evaluate lining of uterus; normal exam done today.  Pap smear and HPV testing done today due to hx LOKI III; Courtney has had only one co-testing since our LEEP together in 2017.  IF normal today, would recommend co-testing every 3yrs per guidelines.

## 2021-02-13 LAB
ALBUMIN SERPL-MCNC: 4.2 G/DL (ref 3.4–5)
ALP SERPL-CCNC: 87 U/L (ref 40–150)
ALT SERPL W P-5'-P-CCNC: 44 U/L (ref 0–50)
ANION GAP SERPL CALCULATED.3IONS-SCNC: 7 MMOL/L (ref 3–14)
AST SERPL W P-5'-P-CCNC: 20 U/L (ref 0–45)
BILIRUB SERPL-MCNC: 0.5 MG/DL (ref 0.2–1.3)
BUN SERPL-MCNC: 6 MG/DL (ref 7–30)
CALCIUM SERPL-MCNC: 9.8 MG/DL (ref 8.5–10.1)
CHLORIDE SERPL-SCNC: 107 MMOL/L (ref 94–109)
CHOLEST SERPL-MCNC: 119 MG/DL
CO2 SERPL-SCNC: 26 MMOL/L (ref 20–32)
CREAT SERPL-MCNC: 0.54 MG/DL (ref 0.52–1.04)
DEPRECATED CALCIDIOL+CALCIFEROL SERPL-MC: 25 UG/L (ref 20–75)
GFR SERPL CREATININE-BSD FRML MDRD: >90 ML/MIN/{1.73_M2}
GLUCOSE SERPL-MCNC: 85 MG/DL (ref 70–99)
HDLC SERPL-MCNC: 36 MG/DL
LDLC SERPL CALC-MCNC: 31 MG/DL
NONHDLC SERPL-MCNC: 83 MG/DL
POTASSIUM SERPL-SCNC: 3.7 MMOL/L (ref 3.4–5.3)
PROT SERPL-MCNC: 7.9 G/DL (ref 6.8–8.8)
SODIUM SERPL-SCNC: 140 MMOL/L (ref 133–144)
T4 FREE SERPL-MCNC: 1.18 NG/DL (ref 0.76–1.46)
TRIGL SERPL-MCNC: 261 MG/DL
TSH SERPL DL<=0.005 MIU/L-ACNC: 1.58 MU/L (ref 0.4–4)

## 2021-02-13 ASSESSMENT — ANXIETY QUESTIONNAIRES: GAD7 TOTAL SCORE: 0

## 2021-02-17 LAB
COPATH REPORT: NORMAL
PAP: NORMAL

## 2021-02-17 NOTE — RESULT ENCOUNTER NOTE
Please inform of results --most of labs requested by her nutritionist look good.  We will  Have her take these results back to her or send them on for management as we discussed at her visit last week.  Electrolytes and thyroid testing normal.  Cholesterol --namely her triglycerides are quite elevated.  These can be improved by watching her sugar/carb intake and continuing to work hard on weight loss.  Limit alcohol use.  INcrease healthy fat intake including healthy oils, fish, etc.   Finally, 2 labs that I had mentioned that I dont' normally check --her CRP (marker of inflammation) and insulin --were both elevated.  As we discussed again at her visit, I agreed to check these but don't manage these so she can discuss with her primary provider  Will hear from us about pap smear later this week

## 2021-02-18 LAB
FINAL DIAGNOSIS: NORMAL
HPV HR 12 DNA CVX QL NAA+PROBE: NEGATIVE
HPV16 DNA SPEC QL NAA+PROBE: NEGATIVE
HPV18 DNA SPEC QL NAA+PROBE: NEGATIVE
SPECIMEN DESCRIPTION: NORMAL
SPECIMEN SOURCE CVX/VAG CYTO: NORMAL

## 2021-02-23 ENCOUNTER — PATIENT OUTREACH (OUTPATIENT)
Dept: OBGYN | Facility: CLINIC | Age: 31
End: 2021-02-23

## 2021-03-06 ENCOUNTER — HEALTH MAINTENANCE LETTER (OUTPATIENT)
Age: 31
End: 2021-03-06

## 2021-04-24 ENCOUNTER — HEALTH MAINTENANCE LETTER (OUTPATIENT)
Age: 31
End: 2021-04-24

## 2021-10-09 ENCOUNTER — HEALTH MAINTENANCE LETTER (OUTPATIENT)
Age: 31
End: 2021-10-09

## 2022-01-28 ENCOUNTER — PATIENT OUTREACH (OUTPATIENT)
Dept: OBGYN | Facility: CLINIC | Age: 32
End: 2022-01-28
Payer: COMMERCIAL

## 2022-01-28 DIAGNOSIS — D06.9 CIN III WITH SEVERE DYSPLASIA: ICD-10-CM

## 2022-01-28 NOTE — LETTER
January 28, 2022      Courtney Rebolledo  [address] [place] [address]        Dear ,    This letter is to remind you that you are due for your follow-up Pap smear and Human Papillomavirus (HPV) test.    Please call [phone] to schedule your appointment at your earliest convenience.    If you have completed the appointment outside of the [facility] system, please have the records forwarded to our office. We will update your chart for your provider to review before your next annual wellness visit.     Thank you for choosing [facility]!      Sincerely,    Your [facility] Care Team

## 2022-03-01 NOTE — TELEPHONE ENCOUNTER
Patient due for Pap and HPV.    Reminder done today via telephone call - pt notified. She has moved to [place]. Encouraged to request transfer of records from our clinic to new care provider if she hasn't already.     End tracking.

## 2022-05-16 ENCOUNTER — HEALTH MAINTENANCE LETTER (OUTPATIENT)
Age: 32
End: 2022-05-16

## 2022-09-11 ENCOUNTER — HEALTH MAINTENANCE LETTER (OUTPATIENT)
Age: 32
End: 2022-09-11

## 2023-06-03 ENCOUNTER — HEALTH MAINTENANCE LETTER (OUTPATIENT)
Age: 33
End: 2023-06-03

## 2024-07-13 ENCOUNTER — HEALTH MAINTENANCE LETTER (OUTPATIENT)
Age: 34
End: 2024-07-13

## (undated) DEVICE — ESU GROUND PAD UNIVERSAL W/O CORD

## (undated) DEVICE — LINEN TOWEL PACK X5 5464

## (undated) DEVICE — ESU ELEC LEEP BALL 5MM

## (undated) DEVICE — SOL NACL 0.9% IRRIG 1000ML BOTTLE 07138-09

## (undated) DEVICE — PACK MINOR LITHOTOMY SBA15LIFSC

## (undated) DEVICE — NDL SPINAL 22GA 3.5" QUINCKE 405181

## (undated) DEVICE — ESU PENCIL W/HOLSTER

## (undated) DEVICE — NDL COUNTER 10CT

## (undated) DEVICE — SYR 10ML FINGER CONTROL W/O NDL 309695

## (undated) DEVICE — TUBING SUCTION 12"X1/4" N612